# Patient Record
Sex: MALE | Race: OTHER | Employment: FULL TIME | URBAN - METROPOLITAN AREA
[De-identification: names, ages, dates, MRNs, and addresses within clinical notes are randomized per-mention and may not be internally consistent; named-entity substitution may affect disease eponyms.]

---

## 2019-09-12 ENCOUNTER — HOSPITAL ENCOUNTER (EMERGENCY)
Facility: HOSPITAL | Age: 24
Discharge: HOME | End: 2019-09-12
Attending: EMERGENCY MEDICINE
Payer: OTHER MISCELLANEOUS

## 2019-09-12 VITALS
BODY MASS INDEX: 21.03 KG/M2 | SYSTOLIC BLOOD PRESSURE: 127 MMHG | RESPIRATION RATE: 18 BRPM | DIASTOLIC BLOOD PRESSURE: 62 MMHG | HEIGHT: 69 IN | WEIGHT: 142 LBS | HEART RATE: 56 BPM | TEMPERATURE: 97 F | OXYGEN SATURATION: 100 %

## 2019-09-12 DIAGNOSIS — T26.02XA BURN OF LEFT EYELID, INITIAL ENCOUNTER: Primary | ICD-10-CM

## 2019-09-12 PROCEDURE — 99283 EMERGENCY DEPT VISIT LOW MDM: CPT

## 2019-09-13 NOTE — ED PROVIDER NOTES
Patient Seen in: [facility] Emergency Department    History   Patient presents with:   Eye Visual Problem (opthalmic)    Stated Complaint: oil burn to eye on Tuesday    21year-old male who presents to the emergency room with complaints of a burn to All other systems reviewed and negative except as noted above.     Physical Exam     ED Triage Vitals [09/12/19 1919]   /62   Pulse 56   Resp 18   Temp 97.4 °F (36.3 °C)   Temp src Temporal   SpO2 100 %   O2 Device None (Room air)       Current: I have discussed with the patient and/or family the results of test, differential diagnosis, treatment plan, warning signs and symptoms which should prompt immediate return.   The patient and/or family expressed clear understanding of these instructions and

## 2021-04-06 ENCOUNTER — HOSPITAL ENCOUNTER (OUTPATIENT)
Age: 26
Discharge: HOME | End: 2021-04-06
Payer: OTHER MISCELLANEOUS

## 2021-04-06 VITALS
BODY MASS INDEX: 21 KG/M2 | TEMPERATURE: 98 F | WEIGHT: 142 LBS | OXYGEN SATURATION: 99 % | RESPIRATION RATE: 18 BRPM | HEART RATE: 65 BPM | DIASTOLIC BLOOD PRESSURE: 78 MMHG | SYSTOLIC BLOOD PRESSURE: 140 MMHG

## 2021-04-06 DIAGNOSIS — Z77.098 CHEMICAL EXPOSURE OF EYE: Primary | ICD-10-CM

## 2021-04-06 PROCEDURE — 99202 OFFICE O/P NEW SF 15 MIN: CPT | Performed by: NURSE PRACTITIONER

## 2021-04-06 RX ORDER — TETRACAINE HYDROCHLORIDE 5 MG/ML
1 SOLUTION OPHTHALMIC ONCE
Status: COMPLETED | OUTPATIENT
Start: 2021-04-06 | End: 2021-04-06

## 2021-04-06 RX ORDER — TOBRAMYCIN 3 MG/ML
2 SOLUTION/ DROPS OPHTHALMIC EVERY 6 HOURS
Qty: 1 BOTTLE | Refills: 0 | Status: SHIPPED | OUTPATIENT
Start: 2021-04-06 | End: 2021-04-13

## 2021-04-06 NOTE — ED INITIAL ASSESSMENT (HPI)
Grease splash to right eye while at work on 4/3/21. Now eye feels dry & has a bump to inside of lid.

## 2021-04-06 NOTE — ED PROVIDER NOTES
Patient Seen in: [facility]      History   Patient presents with: Eye Visual Problem: right eye    Stated Complaint: WC / RIGHT EYE IRRITATION    HPI/Subjective:   HPI   Worker's Comp.;  Place of injury;  [address] [place] Temp src Temporal   SpO2 99 %   O2 Device        Current:/78   Pulse 65   Temp 98 °F (36.7 °C) (Temporal)   Resp 18   Wt 64.4 kg   SpO2 99%   BMI 20.97 kg/m²     Right Eye Chart Acuity: 20/25, Uncorrected  Left Eye Chart Acuity: 20/25, Uncorrected encounter diagnosis)    Disposition:  Discharge  4/6/2021  3:05 pm    Follow-up:  Francia Sanchez [place] [id number] [address] [facility] [phone]      Ophthalmology referral if needed    Trinay MD Avila  [address]

## 2021-05-09 ENCOUNTER — HOSPITAL ENCOUNTER (EMERGENCY)
Facility: HOSPITAL | Age: 26
Discharge: HOME | End: 2021-05-09
Attending: EMERGENCY MEDICINE
Payer: OTHER MISCELLANEOUS

## 2021-05-09 VITALS
DIASTOLIC BLOOD PRESSURE: 67 MMHG | TEMPERATURE: 98 F | HEIGHT: 72 IN | HEART RATE: 80 BPM | SYSTOLIC BLOOD PRESSURE: 126 MMHG | WEIGHT: 200 LBS | BODY MASS INDEX: 27.09 KG/M2 | OXYGEN SATURATION: 98 % | RESPIRATION RATE: 17 BRPM

## 2021-05-09 DIAGNOSIS — S61.311A LACERATION OF LEFT INDEX FINGER WITHOUT FOREIGN BODY WITH DAMAGE TO NAIL, INITIAL ENCOUNTER: Primary | ICD-10-CM

## 2021-05-09 PROCEDURE — 99283 EMERGENCY DEPT VISIT LOW MDM: CPT

## 2021-05-09 PROCEDURE — 29130 APPL FINGER SPLINT STATIC: CPT

## 2021-05-09 PROCEDURE — 90471 IMMUNIZATION ADMIN: CPT

## 2021-05-10 NOTE — ED INITIAL ASSESSMENT (HPI)
Pt ambulated to er with complaint of laceration to left hand second digit  At work  No loc  bld controlled with bandage  Not sure of last tetanus

## 2021-05-10 NOTE — ED PROVIDER NOTES
Patient Seen in: [facility] Emergency Department      History   Patient presents with:  Laceration/Abrasion    Stated Complaint: laceration while working at [employer] [facility]    HPI/Subjective:   HPI    Patient is a pleasant 45-year-old right-handed male breath sounds. Musculoskeletal:         General: Normal range of motion. Comments: There is sort of a fillet type laceration over the ulnar half of the left index finger fingernail.  No intrusion in the nailbed and nothing that can be sutured, he jus

## (undated) NOTE — ED AVS SNAPSHOT
Emiliana Aponte   MRN: [id number]    Department:  [facility] Emergency Department   Date of Visit:  9/12/2019           Disclosure     Insurance plans vary and the physician(s) referred by the ER may not be covered by your plan.  Please contact y tell this physician (or your personal doctor if your instructions are to return to your personal doctor) about any new or lasting problems. The primary care or specialist physician will see patients referred from the [facility] Emergency Department.  Severo Pastures

## (undated) NOTE — LETTER
Date & Time: 5/9/2021, 9:05 PM  Patient: Courtney Jaffe  Encounter Provider(s):    Joshua Beatty MD       To Whom It May Concern:    Courtney Jaffe was seen and treated in our department on 5/9/2021. He can return to work on 5/12/21.     If you h